# Patient Record
(demographics unavailable — no encounter records)

---

## 2024-10-14 NOTE — ASSESSMENT
[FreeTextEntry1] : EXAM Left hand with no swelling or erythema. Able to make fist, oppose thumb to small finger with good thenar bulk. No intrinsic atrophy. Tender along distal palmar crease, most notable at ring finger. No overt locking with catching palpable. Sensation intact throughout with <2sec cap refill.  Left hand radiographs with no fracture nor dislocation. Carpus aligned. Mild thumb CM arthritis. (3-view)   ASSESSMENT & PLAN Left ring finger trigger - Discussed with patient the pathoanatomy of trigger and options going forward. Risks/benefits discussed as well as natural progression that injection will provide some relief but symptoms may recur. Discussed that pain may worsen in coming days but will subside. Discussed that we can repeat an injection or that operative intervention may be indicated down the line. After discussion of risks/benefits, including glucose intolerance in the diabetic population, patient elected to proceed with CSI to the A1 pulley.  Procedure 1 - 0.5cc 1% lidocaine + 0.5cc 40mg/cc Kenalog administered to the left ring A1 pulley following sterilization with Betadine. Patient tolerated this well.   F/u 3months

## 2024-10-14 NOTE — HISTORY OF PRESENT ILLNESS
[de-identified] : Age: 69 year F PMHx: HTN, Dyslipidemia, Carpal tunnel surgery on bilateral wrists (1996) Hand Dominance: RHD Chief Complaint: Left hand pain onset late January 2024 with NKI. Patient reports that her symptoms onset with no precipitating factors. Patient reports pain with flexion of the fingers, stating that she feels a knot at the bottom of her ring finger. Patient states that her left hand is swollen and reports she is unable to make a fist, prompting her to get evaluated. Unsure of numbness/tingling.  Trauma: NKI Outside Imaging/Treatment: none OTC Medications: none OT/PT: none Bracing: none Pain worse with: making a fist Pain better with: rest  10/14/24 Following up on her LT hand pain. States the CSI at her last apt in 02/24 gave her good relief up until this past month. States she has radiating pain going up into her elbow.

## 2024-10-18 NOTE — ASSESSMENT
[FreeTextEntry1] : Ms. DEQUAN PEÑA is a 70-year female who presents today for a follow up appointment. Previously, she has been followed for lung nodules.  Past medical history includes carpal tunnel syndrome, pre-diabetes, hypertension, sleep apnea, left lumbar radiculopathy, H/O gastric sleeve, DJD, pulmonary nodules, former smoker.  6/24/24 s/p Robotic Bronchoscopy, ion, with ink marking, robotic assisted left VATS with MARISA wedge resection with MLND. Pathology typical carcinoid.  10/14/2024 CT chest non-contrast at [facility] Radiology - Interval performance of lingular wedge resection, including the previously described 9 mm lingular nodule. Smaller nodules measuring up to 4 mm are stable, as depicted on the saved screen shots, without new or enlarging nodule.  I have reviewed the patient's medical records and diagnostic images at time of this consultation and have made the following recommendations: 1.Will return in ct chest in 3 months for surveillance purposes   All questions answered, patient verbalizes understanding and agrees to follow up accordingly.

## 2024-10-18 NOTE — HISTORY OF PRESENT ILLNESS
[FreeTextEntry1] : Ms. DEQUAN PEÑA is a 70-year female who presents today for a follow up appointment. Previously, she has been followed for lung nodules.  Past medical history includes carpal tunnel syndrome, pre-diabetes, hypertension, sleep apnea, left lumbar radiculopathy, H/O gastric sleeve, DJD, pulmonary nodules, former smoker.  6/24/24 s/p Robotic Bronchoscopy, ion, with ink marking, robotic assisted left VATS with MARISA wedge resection with MLND. Pathology typical carcinoid.  10/14/2024 CT chest non-contrast at [facility] Radiology - Interval performance of lingular wedge resection, including the previously described 9 mm lingular nodule. Smaller nodules measuring up to 4 mm are stable, as depicted on the saved screen shots, without new or enlarging nodule.

## 2024-10-18 NOTE — HISTORY OF PRESENT ILLNESS
[FreeTextEntry1] : Ms. DEQUAN PEÑA is a 70-year female who presents today for a follow up appointment. Previously, she has been followed for lung nodules.  Past medical history includes carpal tunnel syndrome, pre-diabetes, hypertension, sleep apnea, left lumbar radiculopathy, H/O gastric sleeve, DJD, pulmonary nodules, former smoker.  6/24/24 s/p Robotic Bronchoscopy, ion, with ink marking, robotic assisted left VATS with MARISA wedge resection with MLND. Pathology typical carcinoid.  10/14/2024 CT chest non-contrast at [facility] Radiology - Interval performance of lingular wedge resection, including the previously described 9 mm lingular nodule. Smaller nodules measuring up to 4 mm are stable, as depicted on the saved screen shots, without new or enlarging nodule.

## 2024-10-18 NOTE — DATA REVIEWED
[FreeTextEntry1] : Independent review of imaging and independent interpretation was performed at today's visit: - 10/14/2024 CT chest non-contrast at [facility]

## 2024-10-18 NOTE — PHYSICAL EXAM
[Neck Appearance] : the appearance of the neck was normal [Neck Cervical Mass (___cm)] : no neck mass was observed [] : no respiratory distress [Respiration, Rhythm And Depth] : normal respiratory rhythm and effort [Exaggerated Use Of Accessory Muscles For Inspiration] : no accessory muscle use [Examination Of The Chest] : the chest was normal in appearance [Chest Visual Inspection Thoracic Asymmetry] : no chest asymmetry [FreeTextEntry1] : Incisions-clean, dry, intact [Cervical Lymph Nodes Enlarged Posterior Bilaterally] : posterior cervical [Cervical Lymph Nodes Enlarged Anterior Bilaterally] : anterior cervical [Oriented To Time, Place, And Person] : oriented to person, place, and time

## 2024-10-18 NOTE — DATA REVIEWED
[FreeTextEntry1] : Independent review of imaging and independent interpretation was performed at today's visit: - 10/14/2024 CT chest non-contrast at [facility]

## 2024-10-18 NOTE — ASSESSMENT
[FreeTextEntry1] : Ms. DEQUAN PEÑA is a 70-year female who presents today for a follow up appointment. Previously, she has been followed for lung nodules.  Past medical history includes carpal tunnel syndrome, pre-diabetes, hypertension, sleep apnea, left lumbar radiculopathy, H/O gastric sleeve, DJD, pulmonary nodules, former smoker.  6/24/24 s/p Robotic Bronchoscopy, ion, with ink marking, robotic assisted left VATS with MARISA wedge resection with MLND. Pathology typical carcinoid.  10/14/2024 CT chest non-contrast at [facility] Radiology - Interval performance of lingular wedge resection, including the previously described 9 mm lingular nodule. Smaller nodules measuring up to 4 mm are stable, as depicted on the saved screen shots, without new or enlarging nodule.  I have reviewed the patient's medical records and diagnostic images at time of this consultation and have made the following recommendations: 1.Will return in ct chest in 3 months for surveillance purposes   All questions answered, patient verbalizes understanding and agrees to follow up accordingly.

## 2024-11-01 NOTE — ADDENDUM
[FreeTextEntry1] : EKG obtained to assist in the diagnosis and management of assisted problem(s).  She underwent gastric sleeve surgery 2011. She lost 60 to 70 pounds.  She has history of GERD since her gastric sleeve surgery. Takes PPI. Underwent hiatus hernia surgery. History of prediabetes with A1c of 6.1 in June 2023 History of fatty liver.  Hypercholesterolemia: LDL was 143 in June 2021. Now 129 in November 2023.It increased to 165 in June 2023. CT calcium score was 1 in August 2022.  Hypertension: Controlled with current medications History of left total knee replacement surgery.   Stress Test: Stress echo 6/22/2022: Exercised 6 minutes. 7 METS, fair for age and gender. No evidence of ischemia by EKG. Normal stress echocardiogram with no evidence of inducible ischemia. LVEF 60 to 65%. No segmental wall motion abnormalities. Other: Labs: 3/28/2022: A1c 5.5% TSH 1.93 ALT 24 AST 30 creatinine 0.8 potassium 4.8 total cholesterol 206  triglycerides 157 Hgb 12.6 HCT 40.3 Reviewed today:  History of obesity. She had lost weight with gastric sleeve it was complicated by leak and a lot of scarring Blood pressure is controlled. HLD: LLDL increase to 165 in June 2023.  Target LDL, blood pressure, weight, A1c discussed. The patient is taking iron supplements. She has history of iron deficiency secondary to gastric surgery. Lung nodule: Followed by cardiothoracic surgery.

## 2024-11-01 NOTE — HISTORY OF PRESENT ILLNESS
[FreeTextEntry1] : DEQUAN PEÑA  is a 70 year old  F with history of obesity, hypertension, diabetes and dyslipidemia Previously seen Dr. Gonzalez in our office last office visit with josé.  Last seen August 2024.  At that time blood work was requested.  A1c 5.9 hemoglobin 12.6 creatinine 0.7 C-reactive protein within normal limits CPK 98 lipid profile total cholesterol 195  lipoprotein a normal TSH 1.9 hyperlipidemia.  Normal LPA and C-reactive protein.  Returns to review recent labs and echocardiogram.  Echocardiogram is normal left ventricular function follow-up blood work has been requested.  There have been no side effects with the low-dose of atorvastatin.  There is a history of diabetes.  Previously she is on metformin and Ozempic.  This was stopped for side effects.  Discussed trial of tirzepatide.  Instructed to call if adverse effect. There is history of overweight status and prediabetes.  history of past tobacco use.  She recently had a nodule resection.  She had a prior complicated gastric sleeve procedure.  She lost about 60 pounds.  She has longstanding hypertension on irbesartan hydrochlorothiazide. Blood pressure is well-controlled.  Previously GLP agonist was stopped for alopecia. S he was started on rosuvastatin. She developed a headache. This resolved off the medication. Last . She makes an effort to walk.   EKG is normal sinus rhythm low voltage.  Prior calcium score 2022 was 1.  Echocardiogram June 2024 is normal left ventricular function  EKG demonstrates normal sinus rhythm low voltage   Blood work requested. Rechallenge with alternative statin. Instructed to call if adverse effect. Adjunctive dietary measures.  Prior history of gastric sleeve, hiatal hernia repair and steatohepatitis.  Discussed possible rechallenge with alternative GLP agonist. Will readdress at clinical follow-up after upcoming GI evaluation blood work and trial of statin.  Importance of control of modifiable risk factors to reduce long-term cardiovascular risk.

## 2024-11-01 NOTE — CARDIOLOGY SUMMARY
[de-identified] : Reviewed All testing Below today:\par  April 2022: Sinus rhythm 84 bpm [de-identified] : Stress echo 6/22/2022: Exercised 6 minutes.  7 METS, fair for age and gender.  No evidence of ischemia by EKG.  Normal stress echocardiogram with no evidence of inducible ischemia.  LVEF 60 to 65%.  No segmental wall motion abnormalities. [de-identified] : 5/16/2022: EF 55 to 60% Normal LV systolic function and no seg. wall motion abnormalities.  [de-identified] : Labs: 3/28/2022: A1c 5.5% TSH 1.93 ALT 24 AST 30 creatinine 0.8 potassium 4.8 total cholesterol 206  triglycerides 157 Hgb 12.6 HCT 40.3\par  Reviewed today:\par  -Labs June 2021: Alkaline phosphatase 160.  Normal creatinine.  Normal TSH.  .  A1c 5.7

## 2025-01-17 NOTE — HISTORY OF PRESENT ILLNESS
[FreeTextEntry1] : Ms. DEQUAN PEÑA is a 70-year female who presents today for a follow up appointment. 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 She presents today to review and discuss surveillance CT Chest results.   Past medical history includes carpal tunnel syndrome, pre-diabetes, hypertension, sleep apnea, left lumbar radiculopathy, H/O gastric sleeve, DJD, pulmonary nodules, former smoker.  6/24/24 s/p Robotic Bronchoscopy, ion, with ink marking, robotic assisted left VATS with MARISA wedge resection with MLND. Pathology typical carcinoid.  10/14/2024 CT chest non-contrast at [facility] Radiology - Interval performance of lingular wedge resection, including the previously described 9 mm lingular nodule. Smaller nodules measuring up to 4 mm are stable, as depicted on the saved screen shots, without new or enlarging nodule.  1/8/25 CT chest at [facility] Radiology  - Stable postsurgical changes in the lingular lobe - Stable scattered small lung nodules - No suspicious new infiltrate or mass

## 2025-01-17 NOTE — DATA REVIEWED
[FreeTextEntry1] : Independent review of the following imaging and independent interpretation was performed at today's visit: - 1/8/25 CT Chest at [facility]

## 2025-01-17 NOTE — HISTORY OF PRESENT ILLNESS
[FreeTextEntry1] : Ms. DEQUAN PEÑA is a 70-year female who presents today for a follow up appointment. 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 She presents today to review and discuss surveillance CT Chest results.   Past medical history includes carpal tunnel syndrome, pre-diabetes, hypertension, sleep apnea, left lumbar radiculopathy, H/O gastric sleeve, DJD, pulmonary nodules, former smoker.  6/24/24 s/p Robotic Bronchoscopy, ion, with ink marking, robotic assisted left VATS with MARISA wedge resection with MLND. Pathology typical carcinoid.  10/14/2024 CT chest non-contrast at [facility] Radiology - Interval performance of lingular wedge resection, including the previously described 9 mm lingular nodule. Smaller nodules measuring up to 4 mm are stable, as depicted on the saved screen shots, without new or enlarging nodule.  1/8/25 CT chest at [facility] Rad[facility]  - Stable postsurgical changes in the lingular lobe - Stable scattered small lung nodules - No suspicious new infiltrate or mass

## 2025-01-17 NOTE — DATA REVIEWED
[FreeTextEntry1] : Independent review of the following imaging and independent interpretation was performed at today's visit: - 1/8/25 CT Chest at [facility]

## 2025-01-17 NOTE — ASSESSMENT
[FreeTextEntry1] :   I had the pleasure of evaluating Ms. DEQUAN PEÑA today. Briefly, this is a 70 year  old female who is status post left upper lobe wedge resection on 6/24/24 for lung nodule. Pathology and staging were noted to be: Typical Carcinoid   We have been following her since the operation with routine CT scans of the chest. The CT scan images and medical records were independently reviewed.   We discussed the results of the recent CT scan which revealed stable post surgical changes.   Continue surveillance is warranted to monitor closely for recurrence of lung cancer. Moving forward our plan is to obtain a follow up surveillance CT chest in _ months and at that time Ms. PEÑA will return to care in my office to discuss the findings. All questions were answered, concerns were addressed and the patient expressed understanding and compliance with the follow up plan.   Summary of plan: 1. CT chest in 6 months 2. Return to care after testing to review results

## 2025-01-17 NOTE — HISTORY OF PRESENT ILLNESS
[FreeTextEntry1] : Ms. DEQUAN PEÑA is a 70-year female who presents today for a follow up appointment. 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 She presents today to review and discuss surveillance CT Chest results.   Past medical history includes carpal tunnel syndrome, pre-diabetes, hypertension, sleep apnea, left lumbar radiculopathy, H/O gastric sleeve, DJD, pulmonary nodules, former smoker.  6/24/24 s/p Robotic Bronchoscopy, ion, with ink marking, robotic assisted left VATS with MARISA wedge resection with MLND. Pathology typical carcinoid.  10/14/2024 CT chest non-contrast at [facility] Radiology - Interval performance of lingular wedge resection, including the previously described 9 mm lingular nodule. Smaller nodules measuring up to 4 mm are stable, as depicted on the saved screen shots, without new or enlarging nodule.  1/8/25 CT chest at [facility] Radiology  - Stable postsurgical changes in the lingular lobe - Stable scattered small lung nodules - No suspicious new infiltrate or mass

## 2025-01-17 NOTE — PHYSICAL EXAM
[] : no respiratory distress [Respiration, Rhythm And Depth] : normal respiratory rhythm and effort [Exaggerated Use Of Accessory Muscles For Inspiration] : no accessory muscle use [Examination Of The Chest] : the chest was normal in appearance [Chest Visual Inspection Thoracic Asymmetry] : no chest asymmetry [Bowel Sounds] : normal bowel sounds [Abdomen Soft] : soft [Abdomen Tenderness] : non-tender [Cervical Lymph Nodes Enlarged Posterior Bilaterally] : posterior cervical [Cervical Lymph Nodes Enlarged Anterior Bilaterally] : anterior cervical [Supraclavicular Lymph Nodes Enlarged Bilaterally] : supraclavicular [Axillary Lymph Nodes Enlarged Bilaterally] : axillary [Oriented To Time, Place, And Person] : oriented to person, place, and time

## 2025-01-17 NOTE — DATA REVIEWED
[FreeTextEntry1] : Independent review of the following imaging and independent interpretation was performed at today's visit: - 1/8/25 CT Chest at [facility]

## 2025-01-17 NOTE — HISTORY OF PRESENT ILLNESS
[FreeTextEntry1] : Ms. DEQUAN PEÑA is a 70-year female who presents today for a follow up appointment. 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 She presents today to review and discuss surveillance CT Chest results.   Past medical history includes carpal tunnel syndrome, pre-diabetes, hypertension, sleep apnea, left lumbar radiculopathy, H/O gastric sleeve, DJD, pulmonary nodules, former smoker.  6/24/24 s/p Robotic Bronchoscopy, ion, with ink marking, robotic assisted left VATS with MARISA wedge resection with MLND. Pathology typical carcinoid.  10/14/2024 CT chest non-contrast at [facility] Radiology - Interval performance of lingular wedge resection, including the previously described 9 mm lingular nodule. Smaller nodules measuring up to 4 mm are stable, as depicted on the saved screen shots, without new or enlarging nodule.  1/8/25 CT chest at [facility]iology  - Stable postsurgical changes in the lingular lobe - Stable scattered small lung nodules - No suspicious new infiltrate or mass

## 2025-01-17 NOTE — DATA REVIEWED
[FreeTextEntry1] : Independent review of the following imaging and independent interpretation was performed at today's visit: - 1/8/25 CT Chest at [facility]

## 2025-02-04 NOTE — HISTORY OF PRESENT ILLNESS
[FreeTextEntry1] : DEQUAN PEÑA  is a 70 year old  F with history of obesity, hypertension, diabetes and dyslipidemia She was last seen in November.  Blood work January 2025 A1c 6.0 potassium 4.5 creatinine 0.8  total cholesterol 197  recent labs reviewed statin increased and outside CT noncontrast performed by thoracic was notable for calcification within coronary arteries hyperlipidemia coronary artery calcification otherwise the CT had postsurgical change changes and she undergoes surveillance with thoracic surgery she was intolerant of tirzepatide.  She had alopecia.  She is on a higher dose of Lipitor.  There are no adverse effects.  Follow-up ultrasound imaging.  Follow-up blood work.  Discussed long-term LDL goal and threshold.  Cholesterol improving though not yet at goal.  Slow titration due to prior adverse effect.  Instructed to call if any new issues.  Intolerant to GLP agonist.  Multiple trials. There is history of overweight status and prediabetes. history of past tobacco use.  She recently had a nodule resection. She had a prior complicated gastric sleeve procedure. She lost about 60 pounds. She has longstanding hypertension on irbesartan hydrochlorothiazide. Blood pressure is well-controlled. Previously GLP agonist was stopped for alopecia.  She was started on rosuvastatin. She developed a headache. This resolved off the medication. Last . She makes an effort to walk.  Previously seen Dr. Gonzalez in our office last office visit with [facility].  Echocardiogram is normal left ventricular function  A1c 5.9 hemoglobin 12.6 creatinine 0.7 C-reactive protein within normal limits CPK 98 lipid profile total cholesterol 195  lipoprotein a normal TSH 1.9  EKG is normal sinus rhythm low voltage. Prior calcium score 2022 was 1. Echocardiogram June 2024 is normal left ventricular function EKG demonstrates normal sinus rhythm low voltage  hyperlipidemia. Normal LPA and C-reactive protein.  follow-up blood work has been requested.  There have been no side effects with the low-dose of atorvastatin. There is a history of diabetes.  Previously she is on metformin and Ozempic. This was stopped for side effects.  Discussed trial of tirzepatide. Instructed to call if adverse effect.  Blood work requested. Rechallenge with alternative statin. Instructed to call if adverse effect. Adjunctive dietary measures. Prior history of gastric sleeve, hiatal hernia repair and steatohepatitis. Discussed possible rechallenge with alternative GLP agonist. Will readdress at clinical follow-up after upcoming GI evaluation blood work and trial of statin. Importance of control of modifiable risk factors to reduce long-term cardiovascular risk.

## 2025-02-04 NOTE — CARDIOLOGY SUMMARY
[de-identified] : Reviewed All testing Below today:\par  April 2022: Sinus rhythm 84 bpm [de-identified] : Stress echo 6/22/2022: Exercised 6 minutes.  7 METS, fair for age and gender.  No evidence of ischemia by EKG.  Normal stress echocardiogram with no evidence of inducible ischemia.  LVEF 60 to 65%.  No segmental wall motion abnormalities. [de-identified] : 5/16/2022: EF 55 to 60% Normal LV systolic function and no seg. wall motion abnormalities.  [de-identified] : Labs: 3/28/2022: A1c 5.5% TSH 1.93 ALT 24 AST 30 creatinine 0.8 potassium 4.8 total cholesterol 206  triglycerides 157 Hgb 12.6 HCT 40.3\par  Reviewed today:\par  -Labs June 2021: Alkaline phosphatase 160.  Normal creatinine.  Normal TSH.  .  A1c 5.7

## 2025-02-04 NOTE — HISTORY OF PRESENT ILLNESS
[FreeTextEntry1] : DEQUAN PEÑA  is a 70 year old  F with history of obesity, hypertension, diabetes and dyslipidemia She was last seen in November.  Blood work January 2025 A1c 6.0 potassium 4.5 creatinine 0.8  total cholesterol 197  recent labs reviewed statin increased and outside CT noncontrast performed by thoracic was notable for calcification within coronary arteries hyperlipidemia coronary artery calcification otherwise the CT had postsurgical change changes and she undergoes surveillance with thoracic surgery she was intolerant of tirzepatide.  She had alopecia.  She is on a higher dose of Lipitor.  There are no adverse effects.  Follow-up ultrasound imaging.  Follow-up blood work.  Discussed long-term LDL goal and threshold.  Cholesterol improving though not yet at goal.  Slow titration due to prior adverse effect.  Instructed to call if any new issues.  Intolerant to GLP agonist.  Multiple trials. There is history of overweight status and prediabetes. history of past tobacco use.  She recently had a nodule resection. She had a prior complicated gastric sleeve procedure. She lost about 60 pounds. She has longstanding hypertension on irbesartan hydrochlorothiazide. Blood pressure is well-controlled. Previously GLP agonist was stopped for alopecia.  She was started on rosuvastatin. She developed a headache. This resolved off the medication. Last . She makes an effort to walk.  Previously seen Dr. Gonzalez in our office last office visit with [facility].  Echocardiogram is normal left ventricular function  A1c 5.9 hemoglobin 12.6 creatinine 0.7 C-reactive protein within normal limits CPK 98 lipid profile total cholesterol 195  lipoprotein a normal TSH 1.9  EKG is normal sinus rhythm low voltage. Prior calcium score 2022 was 1. Echocardiogram June 2024 is normal left ventricular function EKG demonstrates normal sinus rhythm low voltage  hyperlipidemia. Normal LPA and C-reactive protein.  follow-up blood work has been requested.  There have been no side effects with the low-dose of atorvastatin. There is a history of diabetes.  Previously she is on metformin and Ozempic. This was stopped for side effects.  Discussed trial of tirzepatide. Instructed to call if adverse effect.  Blood work requested. Rechallenge with alternative statin. Instructed to call if adverse effect. Adjunctive dietary measures. Prior history of gastric sleeve, hiatal hernia repair and steatohepatitis. Discussed possible rechallenge with alternative GLP agonist. Will readdress at clinical follow-up after upcoming GI evaluation blood work and trial of statin. Importance of control of modifiable risk factors to reduce long-term cardiovascular risk.

## 2025-02-04 NOTE — CARDIOLOGY SUMMARY
[de-identified] : Reviewed All testing Below today:\par  April 2022: Sinus rhythm 84 bpm [de-identified] : Stress echo 6/22/2022: Exercised 6 minutes.  7 METS, fair for age and gender.  No evidence of ischemia by EKG.  Normal stress echocardiogram with no evidence of inducible ischemia.  LVEF 60 to 65%.  No segmental wall motion abnormalities. [de-identified] : 5/16/2022: EF 55 to 60% Normal LV systolic function and no seg. wall motion abnormalities.  [de-identified] : Labs: 3/28/2022: A1c 5.5% TSH 1.93 ALT 24 AST 30 creatinine 0.8 potassium 4.8 total cholesterol 206  triglycerides 157 Hgb 12.6 HCT 40.3\par  Reviewed today:\par  -Labs June 2021: Alkaline phosphatase 160.  Normal creatinine.  Normal TSH.  .  A1c 5.7

## 2025-03-24 NOTE — REASON FOR VISIT
[FreeTextEntry3] : Pancho Ignacio [FreeTextEntry1] : Follow-up with cardiology for chest pain episodes

## 2025-03-24 NOTE — ASSESSMENT
[FreeTextEntry1] : Patient is a 70-year-old female who is presenting to the cardiology clinic for evaluation of episodes of chest pain.  She is a patient of Dr. Broussard, has had multiple episodes of chest pain.  Appears that she did not realize she was not taking her antihypertensives, could have been the reason why she was having chest pain as it has resolved since she has restarted her antihypertensive.  She does have significant risk factors which include hypertension, hyperlipidemia, coronary calcification, hence we will order a stress test to rule out ischemia as the cause.  Chest pain Patient will follow-up with Dr. Broussard after her exercise stress echocardiogram which has been ordered today in the clinic. ECG done in the clinic shows normal sinus rhythm, no ischemia or infarction at baseline  Hypertension Well-controlled on the regimen that she is on, continue combination of irbesartan and hydrochlorothiazide  Hyperlipidemia Patient on 20 mg daily of atorvastatin, will continue at this time.  Her last LDL was above goal at 121 mg/dL, she has follow-up with Dr. Broussard coming up to discuss on changes.  Patient will follow-up with Dr. Broussard after the stress test has been done, sooner if needed

## 2025-03-24 NOTE — CARDIOLOGY SUMMARY
[de-identified] : 3/24/2025 Normal sinus rhythm, no ischemia or infarction [de-identified] : JESSENIA 2022 Negative JESSENIA for ischemia [de-identified] : 10/2024 1. Left ventricular systolic function is normal with an ejection fraction of 54 % by Purvis's method of disks with an ejection fraction visually estimated at 55 to 60 %. 2. There is mild (grade 1) left ventricular diastolic dysfunction. 3. Normal right ventricular cavity size and normal right ventricular systolic function. 4. Left atrium is normal in size. 5. Tricuspid aortic valve with normal leaflet excursion with normal systolic excursion. 6. The inferior vena cava is normal in size measuring 1.07 cm in diameter, (normal <2.1cm) with normal inspiratory collapse (normal >50%) consistent with normal right atrial pressure (\R\3, range 0-5mmHg). 7. No pericardial effusion seen. 8. Compared to the transthoracic echocardiogram performed on 6/20/2024, there have been no significant interval changes.

## 2025-03-24 NOTE — REVIEW OF SYSTEMS
[Fever] : no fever [Chills] : no chills [SOB] : no shortness of breath [Palpitations] : no palpitations [Syncope] : no syncope [Dizziness] : no dizziness [Tremor] : no tremor was seen

## 2025-03-24 NOTE — HISTORY OF PRESENT ILLNESS
[FreeTextEntry1] : Patient is a 70-year-old female with past medical history of hypertension, hyperlipidemia, coronary calcification who presents to the cardiology clinic for the follow-up.  Patient states that she had ran out off her hypertension medications and forgot to fill, did not realize that she was not taking them until she started having episodes of chest pain.  She has had multiple episodes where she has had midsternal chest pain lasted for a couple of hours, she was also seen by a physician in [place] and was represcribed her antihypertensives.  Since she has been taking her antihypertensive she has not had any more episodes of chest pain.  She has been functional, denies any shortness of breath with exertion.  She denies any orthopnea, PND, lower extremity edema.  She denies any nausea, vomiting.

## 2025-04-07 NOTE — ASSESSMENT
[FreeTextEntry1] : EXAM Left hand with no swelling or erythema. Able to make fist, oppose thumb to small finger with good thenar bulk. No intrinsic atrophy. Tender along distal palmar crease, most notable at ring finger. No overt locking with catching palpable. Sensation intact throughout with <2sec cap refill.  Left hand radiographs with no fracture nor dislocation. Carpus aligned. Mild thumb CM arthritis. (3-view)   ASSESSMENT & PLAN Left ring finger trigger - Discussed with patient the pathoanatomy of trigger and options going forward. Risks/benefits discussed as well as natural progression that injection will provide some relief but symptoms may recur. Discussed that pain may worsen in coming days but will subside. Discussed that we can repeat an injection or that operative intervention may be indicated down the line. After discussion of risks/benefits, including glucose intolerance in the diabetic population, patient elected to proceed with CSI to the A1 pulley.  Procedure 1 - 0.5cc 1% lidocaine + 0.5cc 40mg/cc Kenalog administered to the left ring A1 pulley following sterilization with Betadine. Patient tolerated this well.  Discussed LOCAL ONLY A1 pulley release as next step.   F/u 3months

## 2025-04-07 NOTE — HISTORY OF PRESENT ILLNESS
[de-identified] : Age: 69 year F PMHx: HTN, Dyslipidemia, Carpal tunnel surgery on bilateral wrists (1996) Hand Dominance: RHD Chief Complaint: Left hand pain onset late January 2024 with NKI. Patient reports that her symptoms onset with no precipitating factors. Patient reports pain with flexion of the fingers, stating that she feels a knot at the bottom of her ring finger. Patient states that her left hand is swollen and reports she is unable to make a fist, prompting her to get evaluated. Unsure of numbness/tingling.  Trauma: NKI Outside Imaging/Treatment: none OTC Medications: none OT/PT: none Bracing: none Pain worse with: making a fist Pain better with: rest  10/14/24 Following up on her LT hand pain. States the CSI at her last apt in 02/24, gave her good relief up until this past month. States she has radiating pain going up into her elbow.   04/07: Patient notes that she had two previous CSI within the left ring finger A1 pulley for left ring finger TF, one in 02/2024, and one on 10/2024. Patient notes that the pain and locking came back a couple of weeks ago. Patient not taking anything for the pain because she is only in pain when she uses it or flexes it. Patient notes swelling and cannot wear her ring. Patient tries to avoid that hand. Patient is RHD.   Patient does have PMHx of DM. Patient's last HgA1C 6.0.

## 2025-04-11 NOTE — CARDIOLOGY SUMMARY
[de-identified] : Reviewed All testing Below today:\par  April 2022: Sinus rhythm 84 bpm [de-identified] : Stress echo 6/22/2022: Exercised 6 minutes.  7 METS, fair for age and gender.  No evidence of ischemia by EKG.  Normal stress echocardiogram with no evidence of inducible ischemia.  LVEF 60 to 65%.  No segmental wall motion abnormalities. [de-identified] : 5/16/2022: EF 55 to 60% Normal LV systolic function and no seg. wall motion abnormalities.  [de-identified] : Labs: 3/28/2022: A1c 5.5% TSH 1.93 ALT 24 AST 30 creatinine 0.8 potassium 4.8 total cholesterol 206  triglycerides 157 Hgb 12.6 HCT 40.3\par  Reviewed today:\par  -Labs June 2021: Alkaline phosphatase 160.  Normal creatinine.  Normal TSH.  .  A1c 5.7

## 2025-04-11 NOTE — ASSESSMENT
[FreeTextEntry1] : DEQUAN PEÑA is a 70 year old F who presents today Apr 11, 2025 with the above history and the following active issues:   CAC low risk stress test, reviewed with patient, no further symptoms limits of noninvasive testing reviewed advised to call if any recurrent chest discomfort for further eval consider CCTA hyperlipidemia. Normal LPA and C-reactive protein. Prior history of DMII, gastric sleeve, hiatal hernia repair and steatohepatitis. intol rosuva statin and now atorvastatin  There is a history of diabetes. Importance of control of modifiable risk factors to reduce long-term cardiovascular risk. GLP management with PCP PSCK9-inhibitor mAb which does not cross blood brain barrier Followup labs  Instructed to call if adverse effect. Adjunctive dietary measures. Discussed long-term LDL goal and threshold.  Cholesterol improving though not yet at goal.  Adjunctive dietary and lifestyle modification measures have been reviewed.  Any questions and concerns were addressed and resolved.   Sincerely,   MARYAM Bhakta Patient's history, testing, medications, and any relative changes to plan of care reviewed with supervising MD: Dr. Liang Broussard

## 2025-04-11 NOTE — ADDENDUM
[FreeTextEntry1] : Please note the patient was reviewed with NP Karen Manzo. I was physically present during the service of the patient. I was directly involved in the management plan and recommendations of the care provided to the patient.  I personally reviewed the history and physical examination as documented by the NP above.

## 2025-04-11 NOTE — HISTORY OF PRESENT ILLNESS
[FreeTextEntry1] : DEQUAN PEÑA  is a 70 year old  F  with history of obesity, hypertension, diabetes and dyslipidemia past tobacco use.  Prior notes: She underwent gastric sleeve surgery 2011. She lost 60 to 70 pounds.  She has history of GERD since her gastric sleeve surgery. Takes PPI. Underwent hiatus hernia surgery. History of prediabetes with A1c of 6.1 in June 2023 History of fatty liver.  Hypercholesterolemia: LDL was 143 in June 2021.  CT calcium score was 1 in August 2022.  History of left total knee replacement surgery.   History of obesity. She had lost weight with gastric sleeve it was complicated by leak and a lot of scarring Blood pressure is controlled. HLD: LDL increase to 165 in June 2023.  Lung nodule: Followed by cardiothoracic surgery.  She recently had a nodule resection. She had a prior complicated gastric sleeve procedure. She lost about 60 pounds. She has longstanding hypertension on irbesartan hydrochlorothiazide. Blood pressure is well-controlled. She makes an effort to walk.  she was intolerant of tirzepatide. She had alopecia.  Ozempic is being re-trialed by her PCP  She was started on rosuvastatin. She developed a headache. This resolved off the medication. Last . She is on a higher dose of Lipitor. She is concerned about memory loss.   She was seen in office a few weeks ago after an episode of chest tightness in setting of forgetting BP meds. Sx resolved after restarting. A stress echo was done 4/2/25 exercise duration 6 min 28 sec no ischemia by EKG at 88% MPHR, suboptimal quality of stress echo imaging/lung interference, considered nondiagnostic for imaging portion   Carotid 3/2025 minimal atherosclerosis  Lab 3/2025 a1c 5.9, alk phos 204, cr 0.7, k 4.3, LDL 94, tsh wnl Blood work January 2025 A1c 6.0 potassium 4.5 creatinine 0.8  total cholesterol 197   outside CT noncontrast performed by thoracic was notable for calcification within coronary arteries  otherwise the CT had postsurgical change changes and she undergoes surveillance with thoracic surgery   Echocardiogram is normal left ventricular function A1c 5.9 hemoglobin 12.6 creatinine 0.7 C-reactive protein within normal limits CPK 98 lipid profile total cholesterol 195  lipoprotein a normal TSH 1.9 EKG is normal sinus rhythm low voltage. Prior calcium score 2022 was 1 Echocardiogram June 2024 is normal left ventricular function  Stress Test: Stress echo 6/22/2022: Exercised 6 minutes. 7 METS, fair for age and gender. No evidence of ischemia by EKG. Normal stress echocardiogram with no evidence of inducible ischemia. LVEF 60 to 65%. No segmental wall motion abnormalities. Other: Labs: 3/28/2022: A1c 5.5% TSH 1.93 ALT 24 AST 30 creatinine 0.8 potassium 4.8 total cholesterol 206  triglycerides 157 Hgb 12.6 HCT 40.3

## 2025-04-11 NOTE — REVIEW OF SYSTEMS
[Headache] : headache [Feeling Fatigued] : feeling fatigued [Negative] : Heme/Lymph [Memory Lapses Or Loss] : memory lapses or loss

## 2025-07-07 NOTE — CARDIOLOGY SUMMARY
[de-identified] : Reviewed All testing Below today:\par  April 2022: Sinus rhythm 84 bpm [de-identified] : Stress echo 6/22/2022: Exercised 6 minutes.  7 METS, fair for age and gender.  No evidence of ischemia by EKG.  Normal stress echocardiogram with no evidence of inducible ischemia.  LVEF 60 to 65%.  No segmental wall motion abnormalities. [de-identified] : 5/16/2022: EF 55 to 60% Normal LV systolic function and no seg. wall motion abnormalities.  [de-identified] : Labs: 3/28/2022: A1c 5.5% TSH 1.93 ALT 24 AST 30 creatinine 0.8 potassium 4.8 total cholesterol 206  triglycerides 157 Hgb 12.6 HCT 40.3\par  Reviewed today:\par  -Labs June 2021: Alkaline phosphatase 160.  Normal creatinine.  Normal TSH.  .  A1c 5.7

## 2025-07-07 NOTE — CARDIOLOGY SUMMARY
[de-identified] : Reviewed All testing Below today:\par  April 2022: Sinus rhythm 84 bpm [de-identified] : Stress echo 6/22/2022: Exercised 6 minutes.  7 METS, fair for age and gender.  No evidence of ischemia by EKG.  Normal stress echocardiogram with no evidence of inducible ischemia.  LVEF 60 to 65%.  No segmental wall motion abnormalities. [de-identified] : 5/16/2022: EF 55 to 60% Normal LV systolic function and no seg. wall motion abnormalities.  [de-identified] : Labs: 3/28/2022: A1c 5.5% TSH 1.93 ALT 24 AST 30 creatinine 0.8 potassium 4.8 total cholesterol 206  triglycerides 157 Hgb 12.6 HCT 40.3\par  Reviewed today:\par  -Labs June 2021: Alkaline phosphatase 160.  Normal creatinine.  Normal TSH.  .  A1c 5.7

## 2025-07-07 NOTE — ASSESSMENT
[FreeTextEntry1] : DEQUAN PEÑA is a 70 year old F who presents today Jul 07, 2025 with the above history and the following active issues:  CAC low risk stress test, no further symptoms limits of noninvasive testing reviewed advised to call if any recurrent chest discomfort for further eval consider CCTA hyperlipidemia. Normal LPA and C-reactive protein. Prior history of DMII, gastric sleeve, hiatal hernia repair and steatohepatitis. intol rosuvastatin and now atorvastatin  intol GLP + GIP Importance of control of modifiable risk factors to reduce long-term cardiovascular risk. Agree with nutrition consult Cont PSCK9-inhibitor mAb which does not cross blood brain barrier Followup labs requested Discussed long-term LDL goal and threshold.  Adjunctive dietary and lifestyle modification measures have been reviewed.  Any questions and concerns were addressed and resolved.  Pt requesting to keep her followup in office next month to review the blood work.     Sincerely,   MARYAM Bhakta Patient's history, testing, medications, and any relative changes to plan of care reviewed with supervising MD: Dr. Joni Duncan

## 2025-07-07 NOTE — HISTORY OF PRESENT ILLNESS
[FreeTextEntry1] : DEQUAN PEÑA  is a 70 year old  F  with history of obesity, hypertension, diabetes and dyslipidemia past tobacco use.  Prior notes: She underwent gastric sleeve surgery 2011. She lost 60 to 70 pounds.  She has history of GERD since her gastric sleeve surgery. Takes PPI. Underwent hiatus hernia surgery. History of prediabetes with A1c of 6.1 in June 2023 History of fatty liver.  Hypercholesterolemia: LDL was 143 in June 2021.  CT calcium score was 1 in August 2022.  History of left total knee replacement surgery.   History of obesity. She had lost weight with gastric sleeve it was complicated by leak and a lot of scarring Blood pressure is controlled. HLD: LDL increase to 165 in June 2023.  Lung nodule: Followed by cardiothoracic surgery.  She recently had a nodule resection. She had a prior complicated gastric sleeve procedure. She lost about 60 pounds. She has longstanding hypertension on irbesartan hydrochlorothiazide. Blood pressure is well-controlled. She makes an effort to walk.  she was intolerant of tirzepatide. She had alopecia.  she re-trialed semaglutide and she reports feeling sick now seeing nutritionist She was started on rosuvastatin. She developed a headache. This resolved off the medication.  Then on Lipitor but had concerns about memory loss.  She has now been started on Repatha, off statin. She is tolerating injectable well. Has not had followup labs yet.   March 2025 had an episode of chest tightness in setting of forgetting BP meds. Sx resolved after restarting. A stress echo was done 4/2/25 exercise duration 6 min 28 sec no ischemia by EKG at 88% MPHR, suboptimal quality of stress echo imaging/lung interference, considered nondiagnostic for imaging portion  There has been no further symptoms since then.   Carotid 3/2025 minimal atherosclerosis  Lab 3/2025 a1c 5.9, alk phos 204, cr 0.7, k 4.3, LDL 94, tsh wnl Blood work January 2025 A1c 6.0 potassium 4.5 creatinine 0.8  total cholesterol 197   outside CT noncontrast performed by thoracic was notable for calcification within coronary arteries  otherwise the CT had postsurgical change changes and she undergoes surveillance with thoracic surgery   Past testing for reference: Echocardiogram is normal left ventricular function A1c 5.9 hemoglobin 12.6 creatinine 0.7 C-reactive protein within normal limits CPK 98 lipid profile total cholesterol 195  lipoprotein a normal TSH 1.9 EKG is normal sinus rhythm low voltage. Prior calcium score 2022 was 1 Stress Test: Stress echo 6/22/2022: Exercised 6 minutes. 7 METS, fair for age and gender. No evidence of ischemia by EKG. Normal stress echocardiogram with no evidence of inducible ischemia. LVEF 60 to 65%. No segmental wall motion abnormalities.

## 2025-07-18 NOTE — DATA REVIEWED
[FreeTextEntry1] : Independent review of imaging and independent interpretation was performed at today's visit. CT of the chest from 07/08/25 at [facility]

## 2025-07-18 NOTE — PHYSICAL EXAM
[Neck Appearance] : the appearance of the neck was normal [Examination Of The Chest] : the chest was normal in appearance [Chest Visual Inspection Thoracic Asymmetry] : no chest asymmetry [Bowel Sounds] : normal bowel sounds [Abdomen Soft] : soft [Abdomen Tenderness] : non-tender [Skin Color & Pigmentation] : normal skin color and pigmentation [Skin Turgor] : normal skin turgor [] : no rash [Skin Lesions] : no skin lesions [Oriented To Time, Place, And Person] : oriented to person, place, and time

## 2025-07-18 NOTE — HISTORY OF PRESENT ILLNESS
[FreeTextEntry1] : Ms. DEQUAN PEÑA is a 70-year female who presents today for a follow up appointment. 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 She presents today to review and discuss surveillance CT Chest results.  Past medical history includes carpal tunnel syndrome, pre-diabetes, hypertension, sleep apnea, left lumbar radiculopathy, H/O gastric sleeve, DJD, pulmonary nodules, former smoker.  6/24/24 s/p Robotic Bronchoscopy, ion, with ink marking, robotic assisted left VATS with MARISA wedge resection with MLND. Pathology typical carcinoid.  10/14/2024 CT chest non-contrast at [facility] - Interval performance of lingular wedge resection, including the previously described 9 mm lingular nodule. Smaller nodules measuring up to 4 mm are stable, as depicted on the saved screen shots, without new or enlarging nodule.  1/8/25 CT chest at [facility] - Stable postsurgical changes in the lingular lobe - Stable scattered small lung nodules - No suspicious new infiltrate or mass  07/08/25: CT of the chest from [facility]  Stable postoperative changes in the left upper lobe. Stable small scattered lung nodules. There is no new or suspicious lung nodule. There is distal airway mucous impaction and subsegmental atelectasis medially in the right middle lobe, unchanged.

## 2025-07-18 NOTE — ASSESSMENT
[FreeTextEntry1] : Ms. DEQUAN PEÑA is a 70-year female who presents today for a follow up appointment. 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 She presents today to review and discuss surveillance CT Chest results.  Past medical history includes carpal tunnel syndrome, pre-diabetes, hypertension, sleep apnea, left lumbar radiculopathy, H/O gastric sleeve, DJD, pulmonary nodules, former smoker.  6/24/24 s/p Robotic Bronchoscopy, ion, with ink marking, robotic assisted left VATS with MARISA wedge resection with MLND. Pathology typical carcinoid.  07/08/25: CT of the chest from [facility]  Stable postoperative changes in the left upper lobe. Stable small scattered lung nodules. There is no new or suspicious lung nodule. There is distal airway mucous impaction and subsegmental atelectasis medially in the right middle lobe, unchanged.  I have reviewed the patient's medical records and diagnostic images at time of this office consultation and have made the following recommendation: 1.CT chest in 1 year   All questions answered. Patient verbalized understanding and will follow up accordingly.

## 2025-07-18 NOTE — ASSESSMENT
[FreeTextEntry1] : Ms. DEQUAN PEÑA is a 70-year female who presents today for a follow up appointment. 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 She presents today to review and discuss surveillance CT Chest results.  Past medical history includes carpal tunnel syndrome, pre-diabetes, hypertension, sleep apnea, left lumbar radiculopathy, H/O gastric sleeve, DJD, pulmonary nodules, former smoker.  6/24/24 s/p Robotic Bronchoscopy, ion, with ink marking, robotic assisted left VATS with MARISA wedge resection with MLND. Pathology typical carcinoid.  07/08/25: CT of the chest from [facility]  Stable postoperative changes in the left upper lobe. Stable small scattered lung nodules. There is no new or suspicious lung nodule. There is distal airway mucous impaction and subsegmental atelectasis medially in the right middle lobe, unchanged.  I have reviewed the patient's medical records and diagnostic images at time of this office consultation and have made the following recommendation: 1.CT chest in 1 year   All questions answered. Patient verbalized understanding and will follow up accordingly.

## 2025-07-18 NOTE — ASSESSMENT
[FreeTextEntry1] : Ms. DEQUAN PEÑA is a 70-year female who presents today for a follow up appointment. 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 She presents today to review and discuss surveillance CT Chest results.  Past medical history includes carpal tunnel syndrome, pre-diabetes, hypertension, sleep apnea, left lumbar radiculopathy, H/O gastric sleeve, DJD, pulmonary nodules, former smoker.  6/24/24 s/p Robotic Bronchoscopy, ion, with ink marking, robotic assisted left VATS with MARISA wedge resection with MLND. Pathology typical carcinoid.  07/08/25: CT of the chest from [facility]  Stable postoperative changes in the left upper lobe. Stable small scattered lung nodules. There is no new or suspicious lung nodule. There is distal airway mucous impaction and subsegmental atelectasis medially in the right middle lobe, unchanged.  I have reviewed the patient's medical records and diagnostic images at time of this office consultation and have made the following recommendation: 1.CT chest in 1 year   All questions answered. Patient verbalized understanding and will follow up accordingly.

## 2025-07-18 NOTE — DATA REVIEWED
[FreeTextEntry1] : Independent review of imaging and independent interpretation was performed at today's visit. CT of the chest from 07/08/25 at [facility]

## 2025-07-18 NOTE — HISTORY OF PRESENT ILLNESS
[FreeTextEntry1] : Ms. DEQUAN PEÑA is a 70-year female who presents today for a follow up appointment. 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 She presents today to review and discuss surveillance CT Chest results.  Past medical history includes carpal tunnel syndrome, pre-diabetes, hypertension, sleep apnea, left lumbar radiculopathy, H/O gastric sleeve, DJD, pulmonary nodules, former smoker.  6/24/24 s/p Robotic Bronchoscopy, ion, with ink marking, robotic assisted left VATS with MARISA wedge resection with MLND. Pathology typical carcinoid.  10/14/2024 CT chest non-contrast at [facility] - Interval performance of lingular wedge resection, including the previously described 9 mm lingular nodule. Smaller nodules measuring up to 4 mm are stable, as depicted on the saved screen shots, without new or enlarging nodule.  1/8/25 CT chest at [facility] - Stable postsurgical changes in the lingular lobe - Stable scattered small lung nodules - No suspicious new infiltrate or mass  07/08/25: CT of the chest from [facility]  Stable postoperative changes in the left upper lobe. Stable small scattered lung nodules. There is no new or suspicious lung nodule. There is distal airway mucous impaction and subsegmental atelectasis medially in the right middle lobe, unchanged.

## 2025-07-18 NOTE — DATA REVIEWED
[FreeTextEntry1] : Independent review of imaging and independent interpretation was performed at today's visit. CT of the chest from 07/08/25 at [facility]

## 2025-07-18 NOTE — HISTORY OF PRESENT ILLNESS
[FreeTextEntry1] : Ms. DEQUAN PEÑA is a 70-year female who presents today for a follow up appointment. 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 She presents today to review and discuss surveillance CT Chest results.  Past medical history includes carpal tunnel syndrome, pre-diabetes, hypertension, sleep apnea, left lumbar radiculopathy, H/O gastric sleeve, DJD, pulmonary nodules, former smoker.  6/24/24 s/p Robotic Bronchoscopy, ion, with ink marking, robotic assisted left VATS with MARISA wedge resection with MLND. Pathology typical carcinoid.  10/14/2024 CT chest non-contrast at [facility] - Interval performance of lingular wedge resection, including the previously described 9 mm lingular nodule. Smaller nodules measuring up to 4 mm are stable, as depicted on the saved screen shots, without new or enlarging nodule.  1/8/25 CT chest at [facility] - Stable postsurgical changes in the lingular lobe - Stable scattered small lung nodules - No suspicious new infiltrate or mass  07/08/25: CT of the chest from [facility]  Stable postoperative changes in the left upper lobe. Stable small scattered lung nodules. There is no new or suspicious lung nodule. There is distal airway mucous impaction and subsegmental atelectasis medially in the right middle lobe, unchanged.